# Patient Record
Sex: FEMALE | Race: WHITE | NOT HISPANIC OR LATINO | URBAN - METROPOLITAN AREA
[De-identification: names, ages, dates, MRNs, and addresses within clinical notes are randomized per-mention and may not be internally consistent; named-entity substitution may affect disease eponyms.]

---

## 2024-03-17 ENCOUNTER — OFFICE VISIT (OUTPATIENT)
Dept: URGENT CARE | Facility: CLINIC | Age: 29
End: 2024-03-17
Payer: MEDICAID

## 2024-03-17 VITALS
DIASTOLIC BLOOD PRESSURE: 75 MMHG | WEIGHT: 135 LBS | OXYGEN SATURATION: 99 % | RESPIRATION RATE: 16 BRPM | HEART RATE: 61 BPM | TEMPERATURE: 97.8 F | SYSTOLIC BLOOD PRESSURE: 115 MMHG

## 2024-03-17 DIAGNOSIS — M62.838 TRAPEZIUS MUSCLE SPASM: Primary | ICD-10-CM

## 2024-03-17 PROCEDURE — 99204 OFFICE O/P NEW MOD 45 MIN: CPT | Performed by: PHYSICIAN ASSISTANT

## 2024-03-17 PROCEDURE — 1036F TOBACCO NON-USER: CPT | Performed by: PHYSICIAN ASSISTANT

## 2024-03-17 RX ORDER — METHOCARBAMOL 750 MG/1
750 TABLET, FILM COATED ORAL 4 TIMES DAILY
Qty: 40 TABLET | Refills: 0 | Status: SHIPPED | OUTPATIENT
Start: 2024-03-17 | End: 2024-03-27

## 2024-03-17 ASSESSMENT — PAIN SCALES - GENERAL: PAINLEVEL: 10-WORST PAIN EVER

## 2024-03-17 NOTE — PROGRESS NOTES
Subjective   Patient ID: Yoly Osborne is a 28 y.o. female who presents for Muscle Pain (spasms).  Patient comes in with right-sided thoracic back pain radiating into the neck since Thursday.  Notes that she woke up like this and feels like she slept awkwardly.  Has a history of similar symptoms in the past.  She denies any fevers or chills.  No loss of bowel or bladder control no history of IV drug use no history of trauma to the spine patient otherwise generally healthy per her report and feels well outside of the pain in her back and neck.        The remainder of the systems were reviewed and are negative unless noted above      Objective   /75   Pulse 61   Temp 36.6 °C (97.8 °F)   Resp 16   Wt 61.2 kg (135 lb)   SpO2 99%   Physical Exam  Constitutional:       General: She is not in acute distress.     Appearance: Normal appearance. She is not ill-appearing.   HENT:      Head: Normocephalic and atraumatic.      Mouth/Throat:      Mouth: Mucous membranes are moist.      Pharynx: Oropharynx is clear.   Eyes:      Extraocular Movements: Extraocular movements intact.      Pupils: Pupils are equal, round, and reactive to light.   Cardiovascular:      Rate and Rhythm: Normal rate and regular rhythm.   Pulmonary:      Effort: Pulmonary effort is normal.      Breath sounds: Normal breath sounds.   Musculoskeletal:         General: Tenderness (Tenderness appreciated to the right sided trapezius muscle and at the insertion on the occiput.) present. Normal range of motion.      Cervical back: Normal range of motion and neck supple. No rigidity.      Comments: Range of motion of the neck is intact but the patient does report pain with range of motion along the right sided trapezius muscle   Skin:     General: Skin is warm and dry.      Findings: No rash.   Neurological:      Mental Status: She is alert.         Assessment/Plan   Problem List Items Addressed This Visit       Trapezius muscle spasm -  Primary    Relevant Medications    methocarbamol (Robaxin-750) 750 mg tablet      Patient symptoms and history consistent with likely muscular strain with spasm to the right sided trapezius muscle.  There is no midline spinal tenderness no evidence of weakness.  Patient otherwise afebrile and nontoxic-appearing at time of exam and without any red flag signs.  Treating with methocarbamol and recommending Tylenol and ibuprofen in addition to this to help reduce the pain

## 2024-03-17 NOTE — PATIENT INSTRUCTIONS
Assessment/Plan   Problem List Items Addressed This Visit       Trapezius muscle spasm - Primary    Relevant Medications    methocarbamol (Robaxin-750) 750 mg tablet      Patient symptoms and history consistent with likely muscular strain with spasm to the right sided trapezius muscle.  There is no midline spinal tenderness no evidence of weakness.  Patient otherwise afebrile and nontoxic-appearing at time of exam and without any red flag signs.  Treating with methocarbamol and recommending Tylenol and ibuprofen in addition to this to help reduce the pain

## 2024-04-15 ENCOUNTER — OFFICE VISIT (OUTPATIENT)
Dept: URGENT CARE | Facility: CLINIC | Age: 29
End: 2024-04-15
Payer: MEDICAID

## 2024-04-15 VITALS
DIASTOLIC BLOOD PRESSURE: 81 MMHG | TEMPERATURE: 98.2 F | SYSTOLIC BLOOD PRESSURE: 119 MMHG | RESPIRATION RATE: 14 BRPM | HEART RATE: 76 BPM

## 2024-04-15 DIAGNOSIS — R19.7 DIARRHEA, UNSPECIFIED TYPE: ICD-10-CM

## 2024-04-15 DIAGNOSIS — R11.2 NAUSEA AND VOMITING, UNSPECIFIED VOMITING TYPE: Primary | ICD-10-CM

## 2024-04-15 PROCEDURE — 99213 OFFICE O/P EST LOW 20 MIN: CPT | Performed by: PHYSICIAN ASSISTANT

## 2024-04-15 RX ORDER — ONDANSETRON 4 MG/1
4 TABLET, FILM COATED ORAL EVERY 8 HOURS PRN
Qty: 12 TABLET | Refills: 0 | Status: SHIPPED | OUTPATIENT
Start: 2024-04-15 | End: 2024-04-19

## 2024-04-15 ASSESSMENT — PAIN SCALES - GENERAL: PAINLEVEL: 10-WORST PAIN EVER

## 2024-04-15 NOTE — PATIENT INSTRUCTIONS
Assessment/Plan   Problem List Items Addressed This Visit       Nausea and vomiting - Primary    Relevant Medications    ondansetron (Zofran) 4 mg tablet    Diarrhea   Patient here with complaints of significant nausea vomiting and diarrhea over the course of the last 2 days.  No longer vomiting or having any diarrhea since 2:30 AM this morning.  Abdominal exam is completely benign flat nontender soft with normal active bowel sounds without any guarding or rebound.  Patient is not tachycardic and I do not suspect clinical dehydration IV I am sending prescription of Zofran to help with the patient's nausea and recommending a bland diet.  I discussed with patient if she has any worsening of her symptoms she needs to be evaluated at the emergency room

## 2024-04-15 NOTE — PROGRESS NOTES
Subjective   Patient ID: Yoly Osborne is a 28 y.o. female who presents for Diarrhea and Vomiting.  Patient is here with complaints of diarrhea and vomiting that started the day before yesterday.  She notes innumerable episodes of diarrhea, followed by a handful episodes of vomiting.  She denies any blood in the bowl but does note some streaking blood on her toilet paper 1 time this is not returned.  She notes that the last episode of diarrhea occurred roughly 2:30 AM this morning as well as the vomiting last occurring at roughly 230 this morning.  She denies any fevers or chills she denies abdominal pain whatsoever today despite initially stating that she was having 10 out of 10 pain when triaged.  She has not tried any over-the-counter medications.  Notes that the vomiting and diarrhea have since stopped but she is still having nausea.  She notes that she has been maintaining oral intake of fluids throughout the day today but still feels unwell.  At time of exam the patient is not tachycardic and does not appear acutely toxic.  No recent travel or antibiotic usage    No past medical history on file.      The remainder of the systems were reviewed and are negative unless noted above      Objective   /81   Pulse 76   Temp 36.8 °C (98.2 °F)   Resp 14   Physical Exam  Constitutional:       General: She is not in acute distress.     Appearance: Normal appearance. She is not ill-appearing, toxic-appearing or diaphoretic.   HENT:      Head: Normocephalic and atraumatic.      Mouth/Throat:      Mouth: Mucous membranes are moist.      Pharynx: Oropharynx is clear.   Eyes:      Conjunctiva/sclera: Conjunctivae normal.   Cardiovascular:      Rate and Rhythm: Normal rate and regular rhythm.      Heart sounds: No murmur heard.  Pulmonary:      Effort: Pulmonary effort is normal. No respiratory distress.      Breath sounds: Normal breath sounds. No wheezing.   Abdominal:      General: Abdomen is flat. Bowel  sounds are normal.      Palpations: Abdomen is soft.      Tenderness: There is no abdominal tenderness. There is no guarding or rebound.   Musculoskeletal:         General: No swelling, tenderness, deformity or signs of injury. Normal range of motion.      Cervical back: Normal range of motion and neck supple.   Skin:     General: Skin is warm and dry.      Findings: No erythema or rash.   Neurological:      General: No focal deficit present.      Mental Status: She is alert and oriented to person, place, and time.      Gait: Gait normal.         Assessment/Plan   Problem List Items Addressed This Visit       Nausea and vomiting - Primary    Relevant Medications    ondansetron (Zofran) 4 mg tablet    Diarrhea   Patient here with complaints of significant nausea vomiting and diarrhea over the course of the last 2 days.  No longer vomiting or having any diarrhea since 2:30 AM this morning.  Abdominal exam is completely benign flat nontender soft with normal active bowel sounds without any guarding or rebound.  Patient is not tachycardic and patient mucous membranes are moist and I do not suspect clinical dehydration IV I am sending prescription of Zofran to help with the patient's nausea and recommending a bland diet.  I discussed with patient if she has any worsening of her symptoms she needs to be evaluated at the emergency room